# Patient Record
Sex: MALE | Race: OTHER | NOT HISPANIC OR LATINO | URBAN - METROPOLITAN AREA
[De-identification: names, ages, dates, MRNs, and addresses within clinical notes are randomized per-mention and may not be internally consistent; named-entity substitution may affect disease eponyms.]

---

## 2024-08-11 ENCOUNTER — EMERGENCY (EMERGENCY)
Facility: HOSPITAL | Age: 29
LOS: 1 days | Discharge: HOME | End: 2024-08-11
Admitting: EMERGENCY MEDICINE
Payer: SELF-PAY

## 2024-08-11 VITALS
TEMPERATURE: 98 F | SYSTOLIC BLOOD PRESSURE: 120 MMHG | RESPIRATION RATE: 16 BRPM | HEART RATE: 93 BPM | DIASTOLIC BLOOD PRESSURE: 84 MMHG | OXYGEN SATURATION: 98 %

## 2024-08-11 PROCEDURE — 99283 EMERGENCY DEPT VISIT LOW MDM: CPT

## 2024-08-11 NOTE — ED PROVIDER NOTE - OBJECTIVE STATEMENT
28-year-old male presents this emergency department stating "I do not know nothing".  Patient denies any symptoms currently.  His pulling his pants down, has been repeatedly told to pull them back up.  Is requesting medications, however does not know which medications.  Patient did smoke or smoke prior to arrival.  Is alert orient x 3, gait steady.  Denies SI, HI, hallucinations, delusions.  Is not acutely psychotic.

## 2024-08-11 NOTE — ED PROVIDER NOTE - PATIENT PORTAL LINK FT
You can access the FollowMyHealth Patient Portal offered by [facility] by registering at the following website: [email or web address]. By joining [facility]’s FollowMyHealth portal, you will also be able to view your health information using other applications (apps) compatible with our system.

## 2024-08-11 NOTE — ED PROVIDER NOTE - CLINICAL SUMMARY MEDICAL DECISION MAKING FREE TEXT BOX
28-year-old male presents this emergency department for evaluation  Patient denies any symptoms, is not psychotic currently  All results with patient.  Patient or stands agrees with plan.  Agree to follow primary care doctor in 2 to 3 days.

## 2024-08-11 NOTE — ED ADULT NURSE NOTE - OBJECTIVE STATEMENT
c/o bodyaches s/p smoking crystal meth, no s/s of distress, awaiting provider eval, will continue to monitor for change in assessment. Refusing SBIRT consult and rehab information at this time

## 2024-08-15 DIAGNOSIS — F19.10 OTHER PSYCHOACTIVE SUBSTANCE ABUSE, UNCOMPLICATED: ICD-10-CM
